# Patient Record
Sex: MALE | Race: OTHER | NOT HISPANIC OR LATINO | URBAN - METROPOLITAN AREA
[De-identification: names, ages, dates, MRNs, and addresses within clinical notes are randomized per-mention and may not be internally consistent; named-entity substitution may affect disease eponyms.]

---

## 2018-07-30 ENCOUNTER — EMERGENCY (EMERGENCY)
Facility: HOSPITAL | Age: 48
LOS: 1 days | Discharge: HOME | End: 2018-07-30
Attending: EMERGENCY MEDICINE | Admitting: EMERGENCY MEDICINE
Payer: SELF-PAY

## 2018-07-30 VITALS
RESPIRATION RATE: 18 BRPM | HEART RATE: 83 BPM | DIASTOLIC BLOOD PRESSURE: 84 MMHG | TEMPERATURE: 98 F | OXYGEN SATURATION: 94 % | SYSTOLIC BLOOD PRESSURE: 116 MMHG

## 2018-07-30 DIAGNOSIS — R41.82 ALTERED MENTAL STATUS, UNSPECIFIED: ICD-10-CM

## 2018-07-30 DIAGNOSIS — Z03.89 ENCOUNTER FOR OBSERVATION FOR OTHER SUSPECTED DISEASES AND CONDITIONS RULED OUT: ICD-10-CM

## 2018-07-30 DIAGNOSIS — Z88.8 ALLERGY STATUS TO OTHER DRUGS, MEDICAMENTS AND BIOLOGICAL SUBSTANCES STATUS: ICD-10-CM

## 2018-07-30 PROCEDURE — 82962 GLUCOSE BLOOD TEST: CPT

## 2018-07-30 PROCEDURE — 99283 EMERGENCY DEPT VISIT LOW MDM: CPT

## 2018-07-30 PROCEDURE — 99282 EMERGENCY DEPT VISIT SF MDM: CPT | Mod: 25

## 2018-07-30 PROCEDURE — 99053 MED SERV 10PM-8AM 24 HR FAC: CPT

## 2018-07-30 NOTE — ED PROVIDER NOTE - MEDICAL DECISION MAKING DETAILS
patient in ED brought by EMS 2/2 concern for possible intox.  Patient awake, alert and active in ED.  Benign physical exam.  Gluse within normal limits.  Given sandwich, juice.  Will plan for discharge with instruction for PCP follow up in several days for re evaluation and immediate return to ED should symptoms worsen or if there is any concern prior to this follow up.  Patient aware and verbalizes his understanding of plan.

## 2018-07-30 NOTE — ED PROVIDER NOTE - OBJECTIVE STATEMENT
47 year old 47 year old male presents to ED via EMS transport after being found with concern for possible intox.  On ED arrival, patient is awake, alert and ambulatory in ED, insistent on discharge because "he did nothing wrong."  Patient is speaking without slurred speech and denies any acute medical complaints or concerns when questioned.  He repeatedly asks to go home, stating "you can't keep me here."  He is not in police custody.  Accucheck wnl on ED arrival.

## 2018-07-30 NOTE — ED ADULT NURSE NOTE - CHPI ED SYMPTOMS NEG
no confusion/no weakness/no loss of consciousness/no nausea/no numbness/no vomiting/no fever/no blurred vision/no dizziness/no change in level of consciousness

## 2018-07-30 NOTE — ED ADULT NURSE NOTE - OBJECTIVE STATEMENT
patient aaox3. denies alcohol and drug use. States "I don't want to be here." Denies chest pain, sob, dizziness, N/V/D.

## 2023-11-06 NOTE — ED ADULT TRIAGE NOTE - TEMPERATURE IN FAHRENHEIT (DEGREES F)
Spoke to Landon and gave him the information. He has not heard from Dr. Yañez yet. He would like a 2nd opinion.    98.1
